# Patient Record
Sex: MALE | Race: WHITE | NOT HISPANIC OR LATINO
[De-identification: names, ages, dates, MRNs, and addresses within clinical notes are randomized per-mention and may not be internally consistent; named-entity substitution may affect disease eponyms.]

---

## 2024-03-07 PROBLEM — Z00.00 ENCOUNTER FOR PREVENTIVE HEALTH EXAMINATION: Status: ACTIVE | Noted: 2024-03-07

## 2024-03-12 ENCOUNTER — NON-APPOINTMENT (OUTPATIENT)
Age: 34
End: 2024-03-12

## 2024-03-15 ENCOUNTER — APPOINTMENT (OUTPATIENT)
Dept: ORTHOPEDIC SURGERY | Facility: CLINIC | Age: 34
End: 2024-03-15
Payer: COMMERCIAL

## 2024-03-15 DIAGNOSIS — M75.41 IMPINGEMENT SYNDROME OF RIGHT SHOULDER: ICD-10-CM

## 2024-03-15 DIAGNOSIS — S46.001A UNSPECIFIED INJURY OF MUSCLE(S) AND TENDON(S) OF THE ROTATOR CUFF OF RIGHT SHOULDER, INITIAL ENCOUNTER: ICD-10-CM

## 2024-03-15 PROCEDURE — 73030 X-RAY EXAM OF SHOULDER: CPT | Mod: RT

## 2024-03-15 PROCEDURE — 99203 OFFICE O/P NEW LOW 30 MIN: CPT

## 2024-03-15 RX ORDER — DICLOFENAC SODIUM 75 MG/1
75 TABLET, DELAYED RELEASE ORAL TWICE DAILY
Qty: 60 | Refills: 4 | Status: ACTIVE | COMMUNITY
Start: 2024-03-15 | End: 1900-01-01

## 2024-03-15 NOTE — HISTORY OF PRESENT ILLNESS
[de-identified] : PATIENT COMPLAINS OF RIGHT SHOUDLER - RHD STARTED SEPTEMEBER 2023  PAIN BEGAN AFTER THROWING BALL - PLAYING SOFTBALL PAIN   8 /10 ,  DOES NOT RADIATES DOWN ARM / UP NECK NO  ASSOCIATED NUMBNESS / TINGLING / POPPING  OCCASIONAL   CLICKING   POSSIBLE RIGHT SHOULDER DISLOCATION  2007 - TREATED WITH SLING  WORSE WITH OVERHEAD LIFTING  / RANGE OF MOTION / AT NIGHT   HAS HAD PREVIOUS IMAGING  HAS HAD PHYSICAL THERAPY WITHOUT RELIEF  HAS HAD PREVIOUS SURGERY  HAS HAD PREVIOUS INJECTION .

## 2024-03-15 NOTE — DISCUSSION/SUMMARY
[de-identified] : DICLOFENAC 2 X DAY 2-3 WEEKS HOME EXERCISES DAILY HOME EXERCISES RC TENDONITIS   MRI RULE OUT LABRUM TEAR  /  ROTATOR CUFF INJURY   OFFICE VISIT AFTER MRI

## 2024-03-15 NOTE — PHYSICAL EXAM
[de-identified] : PHYSICAL EXAM  RIGHT  SHOULDER  NECK EXAM  FROM NONTENDER  SPURLING  RIGHT=NEG, LEFT=NEG  MODERATE SCAPULAR PROTRACTION AROM 140 / 140 / 90 / 30 TENDER: SA REGION ANTERIOR AND SLIGHT AC JOINT   SPECIAL TESTING : RUANO - POSITIVE  MICHELLE - POSITIVE  SPEED TEST - NEGATIVE   GEORGE - NEGATIVE  APPREHENSION AND SUPPRESSION - NEGATIVE   RC STRENGTH TESTING  SS:  5/5 SUB 5/5 IS     5/5 BICEPS  5/5  SENSATION  - GROSSLY INTACT   [de-identified] : RIGHT SHOULDER XRAY (2 VIEWS - AP AND OUTLET) -   NO OBVIOUS FRACTURE ,  SEPARATION OR DISLOCATION  NO SIGNIFICANT OSTEOARTHRITIS, TYPE 3B ACROMION + ANTERIOR SPUR  CSA=27.8

## 2024-03-29 ENCOUNTER — TRANSCRIPTION ENCOUNTER (OUTPATIENT)
Age: 34
End: 2024-03-29